# Patient Record
Sex: FEMALE | Race: WHITE | Employment: FULL TIME | URBAN - METROPOLITAN AREA
[De-identification: names, ages, dates, MRNs, and addresses within clinical notes are randomized per-mention and may not be internally consistent; named-entity substitution may affect disease eponyms.]

---

## 2017-05-13 ENCOUNTER — EMPLOYEE WELLNESS (OUTPATIENT)
Dept: OTHER | Age: 32
End: 2017-05-13

## 2017-12-05 ENCOUNTER — OFFICE VISIT (OUTPATIENT)
Dept: FAMILY MEDICINE CLINIC | Age: 32
End: 2017-12-05

## 2017-12-05 VITALS
TEMPERATURE: 98.4 F | SYSTOLIC BLOOD PRESSURE: 102 MMHG | OXYGEN SATURATION: 98 % | HEART RATE: 92 BPM | DIASTOLIC BLOOD PRESSURE: 68 MMHG

## 2017-12-05 DIAGNOSIS — N39.0 URINARY TRACT INFECTION WITH HEMATURIA, SITE UNSPECIFIED: Primary | ICD-10-CM

## 2017-12-05 DIAGNOSIS — R31.9 URINARY TRACT INFECTION WITH HEMATURIA, SITE UNSPECIFIED: Primary | ICD-10-CM

## 2017-12-05 PROBLEM — R30.9 PAIN WITH URINATION: Status: ACTIVE | Noted: 2017-12-05

## 2017-12-05 LAB
BILIRUBIN, POC: ABNORMAL
BLOOD URINE, POC: ABNORMAL
CLARITY, POC: ABNORMAL
COLOR, POC: ABNORMAL
GLUCOSE URINE, POC: ABNORMAL
KETONES, POC: ABNORMAL
LEUKOCYTE EST, POC: ABNORMAL
NITRITE, POC: ABNORMAL
PH, POC: 5.5
PROTEIN, POC: ABNORMAL
SPECIFIC GRAVITY, POC: 1.02
UROBILINOGEN, POC: 0.2

## 2017-12-05 PROCEDURE — 81002 URINALYSIS NONAUTO W/O SCOPE: CPT | Performed by: NURSE PRACTITIONER

## 2017-12-05 PROCEDURE — 99213 OFFICE O/P EST LOW 20 MIN: CPT | Performed by: NURSE PRACTITIONER

## 2017-12-05 RX ORDER — CIPROFLOXACIN 500 MG/1
500 TABLET, FILM COATED ORAL 2 TIMES DAILY
Qty: 10 TABLET | Refills: 0 | Status: SHIPPED | OUTPATIENT
Start: 2017-12-05 | End: 2017-12-10

## 2017-12-05 ASSESSMENT — ENCOUNTER SYMPTOMS
COUGH: 0
VOMITING: 0
NAUSEA: 0
ABDOMINAL PAIN: 0

## 2017-12-07 LAB
ORGANISM: ABNORMAL
ORGANISM: ABNORMAL
URINE CULTURE, ROUTINE: ABNORMAL

## 2018-03-20 VITALS — WEIGHT: 124 LBS | BODY MASS INDEX: 20.76 KG/M2

## 2020-08-25 NOTE — PATIENT INSTRUCTIONS
"Chief Complaint   Patient presents with     Lipids     Hypertension     Pain Management       Initial BP (!) 152/76 (BP Location: Left arm, Patient Position: Chair, Cuff Size: Adult Large)   Pulse 82   Temp 98.2  F (36.8  C) (Tympanic)   Ht 1.651 m (5' 5\")   Wt 97.5 kg (215 lb)   SpO2 98%   BMI 35.78 kg/m   Estimated body mass index is 35.78 kg/m  as calculated from the following:    Height as of this encounter: 1.651 m (5' 5\").    Weight as of this encounter: 97.5 kg (215 lb).  Medication Reconciliation: complete  Liliana Linda LPN    " from front to back. When should you call for help? Call your doctor now or seek immediate medical care if:  · Symptoms such as fever, chills, nausea, or vomiting get worse or appear for the first time. · You have new pain in your back just below your rib cage. This is called flank pain. · There is new blood or pus in your urine. · You have any problems with your antibiotic medicine. Watch closely for changes in your health, and be sure to contact your doctor if:  · You are not getting better after taking an antibiotic for 2 days. · Your symptoms go away but then come back. Where can you learn more? Go to [email or web address] and sign in to your [employer] account. Enter K015 in the [employer] box to learn more about \"Urinary Tract Infection in Women: Care Instructions. \"     If you do not have an account, please click on the \"Sign Up Now\" link. Current as of: November 28, 2016  Content Version: 11.3  © 1765-9832 [employer]. Care instructions adapted under license by [facility] [place] ([facility]). If you have questions about a medical condition or this instruction, always ask your healthcare professional. [place] [address] any warranty or liability for your use of this information.

## 2020-09-22 ENCOUNTER — EMPLOYEE WELLNESS (OUTPATIENT)
Dept: OTHER | Age: 35
End: 2020-09-22

## 2020-09-22 LAB
CHOLESTEROL, TOTAL: 158 MG/DL (ref 0–199)
GLUCOSE BLD-MCNC: 97 MG/DL (ref 70–99)
HDLC SERPL-MCNC: 67 MG/DL (ref 40–60)
LDL CHOLESTEROL CALCULATED: 80 MG/DL
TRIGL SERPL-MCNC: 57 MG/DL (ref 0–150)

## 2020-10-19 VITALS — WEIGHT: 120 LBS | BODY MASS INDEX: 20.09 KG/M2

## 2021-12-21 ENCOUNTER — HOSPITAL ENCOUNTER (OUTPATIENT)
Age: 36
Discharge: HOME | End: 2021-12-21
Attending: OBSTETRICS & GYNECOLOGY | Admitting: OBSTETRICS & GYNECOLOGY
Payer: COMMERCIAL

## 2021-12-21 PROCEDURE — 96365 THER/PROPH/DIAG IV INF INIT: CPT

## 2021-12-21 PROCEDURE — 6360000002 HC RX W HCPCS: Performed by: OBSTETRICS & GYNECOLOGY

## 2021-12-21 PROCEDURE — 2580000003 HC RX 258: Performed by: OBSTETRICS & GYNECOLOGY

## 2021-12-21 RX ORDER — SODIUM CHLORIDE 9 MG/ML
INJECTION, SOLUTION INTRAVENOUS ONCE
Status: COMPLETED | OUTPATIENT
Start: 2021-12-21 | End: 2021-12-21

## 2021-12-21 RX ADMIN — SODIUM CHLORIDE: 9 INJECTION, SOLUTION INTRAVENOUS at 12:42

## 2021-12-21 RX ADMIN — IRON SUCROSE 200 MG: 20 INJECTION, SOLUTION INTRAVENOUS at 12:45

## 2021-12-21 NOTE — FLOWSHEET NOTE
Patient here for first venofer. Discussed order, POC and scheduling for next 2 weeks. Verbal understanding recd.

## 2021-12-23 ENCOUNTER — HOSPITAL ENCOUNTER (OUTPATIENT)
Age: 36
Discharge: HOME | End: 2021-12-23
Attending: OBSTETRICS & GYNECOLOGY | Admitting: OBSTETRICS & GYNECOLOGY
Payer: COMMERCIAL

## 2021-12-23 VITALS
HEART RATE: 80 BPM | HEIGHT: 66 IN | TEMPERATURE: 98.4 F | WEIGHT: 119 LBS | BODY MASS INDEX: 19.13 KG/M2 | SYSTOLIC BLOOD PRESSURE: 107 MMHG | DIASTOLIC BLOOD PRESSURE: 64 MMHG | RESPIRATION RATE: 16 BRPM

## 2021-12-23 PROCEDURE — 2580000003 HC RX 258: Performed by: OBSTETRICS & GYNECOLOGY

## 2021-12-23 PROCEDURE — 96365 THER/PROPH/DIAG IV INF INIT: CPT

## 2021-12-23 PROCEDURE — 6360000002 HC RX W HCPCS: Performed by: OBSTETRICS & GYNECOLOGY

## 2021-12-23 RX ORDER — SODIUM CHLORIDE 9 MG/ML
INJECTION, SOLUTION INTRAVENOUS CONTINUOUS
Status: DISCONTINUED | OUTPATIENT
Start: 2021-12-23 | End: 2021-12-23 | Stop reason: HOSPADM

## 2021-12-23 RX ADMIN — Medication 200 MG: at 09:01

## 2021-12-23 RX ADMIN — SODIUM CHLORIDE: 9 INJECTION, SOLUTION INTRAVENOUS at 08:59

## 2021-12-28 ENCOUNTER — HOSPITAL ENCOUNTER (OUTPATIENT)
Age: 36
Discharge: HOME | End: 2021-12-28
Attending: OBSTETRICS & GYNECOLOGY | Admitting: OBSTETRICS & GYNECOLOGY
Payer: COMMERCIAL

## 2021-12-28 VITALS — TEMPERATURE: 98.1 F | RESPIRATION RATE: 18 BRPM

## 2021-12-28 PROCEDURE — 96367 TX/PROPH/DG ADDL SEQ IV INF: CPT

## 2021-12-28 PROCEDURE — 96365 THER/PROPH/DIAG IV INF INIT: CPT

## 2021-12-28 PROCEDURE — 2580000003 HC RX 258: Performed by: OBSTETRICS & GYNECOLOGY

## 2021-12-28 PROCEDURE — 6360000002 HC RX W HCPCS: Performed by: OBSTETRICS & GYNECOLOGY

## 2021-12-28 RX ORDER — SODIUM CHLORIDE 9 MG/ML
INJECTION, SOLUTION INTRAVENOUS CONTINUOUS
Status: DISCONTINUED | OUTPATIENT
Start: 2021-12-28 | End: 2021-12-28 | Stop reason: HOSPADM

## 2021-12-28 RX ADMIN — SODIUM CHLORIDE: 9 INJECTION, SOLUTION INTRAVENOUS at 08:27

## 2021-12-28 RX ADMIN — Medication 200 MG: at 08:28

## 2021-12-28 NOTE — FLOWSHEET NOTE
Pt discharged to home after venofer infusion without difficulty. Pt scheduled for venofer #4 this Thursday.

## 2022-01-10 ENCOUNTER — HOSPITAL ENCOUNTER (OUTPATIENT)
Age: 37
Discharge: HOME | End: 2022-01-10
Attending: OBSTETRICS & GYNECOLOGY | Admitting: OBSTETRICS & GYNECOLOGY
Payer: COMMERCIAL

## 2022-01-10 VITALS — RESPIRATION RATE: 16 BRPM | TEMPERATURE: 97.6 F

## 2022-01-10 PROBLEM — O99.019 ANEMIA AFFECTING PREGNANCY: Status: ACTIVE | Noted: 2022-01-10

## 2022-01-10 PROCEDURE — 2580000003 HC RX 258: Performed by: OBSTETRICS & GYNECOLOGY

## 2022-01-10 PROCEDURE — 96365 THER/PROPH/DIAG IV INF INIT: CPT

## 2022-01-10 PROCEDURE — 6360000002 HC RX W HCPCS: Performed by: OBSTETRICS & GYNECOLOGY

## 2022-01-10 RX ORDER — FERROUS SULFATE 325(65) MG
325 TABLET ORAL
COMMUNITY

## 2022-01-10 RX ORDER — SODIUM CHLORIDE 9 MG/ML
INJECTION, SOLUTION INTRAVENOUS CONTINUOUS
Status: DISCONTINUED | OUTPATIENT
Start: 2022-01-10 | End: 2022-01-10 | Stop reason: HOSPADM

## 2022-01-10 RX ADMIN — Medication 200 MG: at 08:24

## 2022-01-10 RX ADMIN — SODIUM CHLORIDE: 9 INJECTION, SOLUTION INTRAVENOUS at 08:22

## 2022-01-10 NOTE — FLOWSHEET NOTE
Pt presents to triage for Venofer infusion. Pt states that she has had 3 doses but then Contracted Covid and has been a couple weeks since third dose. Dr Rosie Mayers called and notified. OK per MD to infuse 4th dose and then 5th dose in a couple days.

## 2022-01-10 NOTE — FLOWSHEET NOTE
Pt infusion completed without difficulties. Pt 5th Venofer dose scheduled for Thursday am.  Pt discharged to home as ordered ambulatory.

## 2022-01-11 ENCOUNTER — CARE COORDINATION (OUTPATIENT)
Dept: OTHER | Facility: CLINIC | Age: 37
End: 2022-01-11

## 2022-01-11 NOTE — CARE COORDINATION
ACM attempted to reach patient for introduction to Associate Care Management related to Maternity CM. HIPAA compliant message left requesting a return phone call. Will attempt to outreach patient again. JYOTSNA Wilkins, RN  Associate Care Manager   Cell: [phone]  [email or web address]

## 2022-03-30 ENCOUNTER — ROUTINE PRENATAL (OUTPATIENT)
Dept: PERINATAL CARE | Age: 37
End: 2022-03-30
Payer: COMMERCIAL

## 2022-03-30 VITALS
BODY MASS INDEX: 21.47 KG/M2 | HEART RATE: 77 BPM | SYSTOLIC BLOOD PRESSURE: 109 MMHG | DIASTOLIC BLOOD PRESSURE: 68 MMHG | WEIGHT: 133 LBS

## 2022-03-30 DIAGNOSIS — Z34.82 PRENATAL CARE, SUBSEQUENT PREGNANCY, SECOND TRIMESTER: ICD-10-CM

## 2022-03-30 DIAGNOSIS — O09.522 ADVANCED MATERNAL AGE IN MULTIGRAVIDA, SECOND TRIMESTER: ICD-10-CM

## 2022-03-30 DIAGNOSIS — Z3A.23 23 WEEKS GESTATION OF PREGNANCY: ICD-10-CM

## 2022-03-30 DIAGNOSIS — O35.BXX0 FETAL CARDIAC ANOMALY COMPLICATING PREGNANCY, ANTEPARTUM, SINGLE GESTATION: Primary | ICD-10-CM

## 2022-03-30 PROCEDURE — 76811 OB US DETAILED SNGL FETUS: CPT

## 2022-03-30 NOTE — PROGRESS NOTES
Pregnancy ultrasound Level ll with consult ? ? micrognathia, denies cramping bleeding or leaking. + fetal movement t- 97.7

## 2022-03-30 NOTE — PROGRESS NOTES
[facility]   The patient was seen for a detailed sonogram and consultation regarding suspected micrognathia (not found) and possible VSD (suspected based on today's images). Please see the full ultrasound report under the Media tab.      Nia Collado MD   3/30/22

## 2022-06-27 LAB — GBS, EXTERNAL RESULT: POSITIVE

## 2022-08-02 ENCOUNTER — ANESTHESIA EVENT (OUTPATIENT)
Dept: LABOR AND DELIVERY | Age: 37
End: 2022-08-02

## 2022-08-02 ENCOUNTER — ANESTHESIA (OUTPATIENT)
Dept: LABOR AND DELIVERY | Age: 37
End: 2022-08-02

## 2022-08-02 ENCOUNTER — HOSPITAL ENCOUNTER (INPATIENT)
Age: 37
LOS: 2 days | Discharge: HOME | End: 2022-08-04
Attending: OBSTETRICS & GYNECOLOGY | Admitting: OBSTETRICS & GYNECOLOGY
Payer: COMMERCIAL

## 2022-08-02 DIAGNOSIS — Z34.90 TERM PREGNANCY: Primary | ICD-10-CM

## 2022-08-02 LAB
ABO/RH: NORMAL
AMPHETAMINE SCREEN, URINE: NORMAL
ANTIBODY SCREEN: NORMAL
BARBITURATE SCREEN URINE: NORMAL
BENZODIAZEPINE SCREEN, URINE: NORMAL
BUPRENORPHINE URINE: NORMAL
CANNABINOID SCREEN URINE: NORMAL
COCAINE METABOLITE SCREEN URINE: NORMAL
HCT VFR BLD CALC: 30.1 % (ref 36–48)
HEMOGLOBIN: 9.6 G/DL (ref 12–16)
Lab: NORMAL
MCH RBC QN AUTO: 22.8 PG (ref 26–34)
MCHC RBC AUTO-ENTMCNC: 31.9 G/DL (ref 31–36)
MCV RBC AUTO: 71.4 FL (ref 80–100)
METHADONE SCREEN, URINE: NORMAL
OPIATE SCREEN URINE: NORMAL
OXYCODONE URINE: NORMAL
PDW BLD-RTO: 16.3 % (ref 12.4–15.4)
PH UA: 6
PHENCYCLIDINE SCREEN URINE: NORMAL
PLATELET # BLD: 324 K/UL (ref 135–450)
PMV BLD AUTO: 7.3 FL (ref 5–10.5)
PROPOXYPHENE SCREEN: NORMAL
RBC # BLD: 4.21 M/UL (ref 4–5.2)
SARS-COV-2, NAAT: NOT DETECTED
WBC # BLD: 6.3 K/UL (ref 4–11)

## 2022-08-02 PROCEDURE — 6370000000 HC RX 637 (ALT 250 FOR IP): Performed by: OBSTETRICS & GYNECOLOGY

## 2022-08-02 PROCEDURE — 0HQ9XZZ REPAIR PERINEUM SKIN, EXTERNAL APPROACH: ICD-10-PCS | Performed by: OBSTETRICS & GYNECOLOGY

## 2022-08-02 PROCEDURE — 86901 BLOOD TYPING SEROLOGIC RH(D): CPT

## 2022-08-02 PROCEDURE — 87635 SARS-COV-2 COVID-19 AMP PRB: CPT

## 2022-08-02 PROCEDURE — 86850 RBC ANTIBODY SCREEN: CPT

## 2022-08-02 PROCEDURE — 80307 DRUG TEST PRSMV CHEM ANLYZR: CPT

## 2022-08-02 PROCEDURE — 0UQMXZZ REPAIR VULVA, EXTERNAL APPROACH: ICD-10-PCS | Performed by: OBSTETRICS & GYNECOLOGY

## 2022-08-02 PROCEDURE — 6360000002 HC RX W HCPCS: Performed by: OBSTETRICS & GYNECOLOGY

## 2022-08-02 PROCEDURE — 85027 COMPLETE CBC AUTOMATED: CPT

## 2022-08-02 PROCEDURE — 86900 BLOOD TYPING SEROLOGIC ABO: CPT

## 2022-08-02 PROCEDURE — 1220000000 HC SEMI PRIVATE OB R&B

## 2022-08-02 PROCEDURE — 2500000003 HC RX 250 WO HCPCS

## 2022-08-02 PROCEDURE — 7200000001 HC VAGINAL DELIVERY

## 2022-08-02 PROCEDURE — 86780 TREPONEMA PALLIDUM: CPT

## 2022-08-02 PROCEDURE — 2580000003 HC RX 258: Performed by: OBSTETRICS & GYNECOLOGY

## 2022-08-02 PROCEDURE — 3E033VJ INTRODUCTION OF OTHER HORMONE INTO PERIPHERAL VEIN, PERCUTANEOUS APPROACH: ICD-10-PCS | Performed by: OBSTETRICS & GYNECOLOGY

## 2022-08-02 RX ORDER — SODIUM CHLORIDE 0.9 % (FLUSH) 0.9 %
5-40 SYRINGE (ML) INJECTION EVERY 12 HOURS SCHEDULED
Status: CANCELLED | OUTPATIENT
Start: 2022-08-02

## 2022-08-02 RX ORDER — SODIUM CHLORIDE, SODIUM LACTATE, POTASSIUM CHLORIDE, AND CALCIUM CHLORIDE .6; .31; .03; .02 G/100ML; G/100ML; G/100ML; G/100ML
1000 INJECTION, SOLUTION INTRAVENOUS PRN
Status: DISCONTINUED | OUTPATIENT
Start: 2022-08-02 | End: 2022-08-02

## 2022-08-02 RX ORDER — SODIUM CHLORIDE, SODIUM LACTATE, POTASSIUM CHLORIDE, CALCIUM CHLORIDE 600; 310; 30; 20 MG/100ML; MG/100ML; MG/100ML; MG/100ML
INJECTION, SOLUTION INTRAVENOUS CONTINUOUS
Status: DISCONTINUED | OUTPATIENT
Start: 2022-08-02 | End: 2022-08-02

## 2022-08-02 RX ORDER — ONDANSETRON 2 MG/ML
4 INJECTION INTRAMUSCULAR; INTRAVENOUS EVERY 6 HOURS PRN
Status: DISCONTINUED | OUTPATIENT
Start: 2022-08-02 | End: 2022-08-02

## 2022-08-02 RX ORDER — MISOPROSTOL 100 UG/1
800 TABLET ORAL PRN
Status: CANCELLED | OUTPATIENT
Start: 2022-08-02

## 2022-08-02 RX ORDER — METHYLERGONOVINE MALEATE 0.2 MG/ML
200 INJECTION INTRAVENOUS PRN
Status: DISCONTINUED | OUTPATIENT
Start: 2022-08-02 | End: 2022-08-02

## 2022-08-02 RX ORDER — SODIUM CHLORIDE 0.9 % (FLUSH) 0.9 %
5-40 SYRINGE (ML) INJECTION EVERY 12 HOURS SCHEDULED
Status: DISCONTINUED | OUTPATIENT
Start: 2022-08-02 | End: 2022-08-04 | Stop reason: HOSPADM

## 2022-08-02 RX ORDER — SODIUM CHLORIDE 0.9 % (FLUSH) 0.9 %
5-40 SYRINGE (ML) INJECTION PRN
Status: DISCONTINUED | OUTPATIENT
Start: 2022-08-02 | End: 2022-08-02

## 2022-08-02 RX ORDER — BUTORPHANOL TARTRATE 1 MG/ML
1 INJECTION, SOLUTION INTRAMUSCULAR; INTRAVENOUS
Status: DISCONTINUED | OUTPATIENT
Start: 2022-08-02 | End: 2022-08-02

## 2022-08-02 RX ORDER — HYDROCODONE BITARTRATE AND ACETAMINOPHEN 5; 325 MG/1; MG/1
1 TABLET ORAL EVERY 6 HOURS PRN
Qty: 15 TABLET | Refills: 0 | Status: SHIPPED | OUTPATIENT
Start: 2022-08-02 | End: 2022-08-09

## 2022-08-02 RX ORDER — OXYCODONE HYDROCHLORIDE 5 MG/1
5 TABLET ORAL EVERY 4 HOURS PRN
Status: DISCONTINUED | OUTPATIENT
Start: 2022-08-02 | End: 2022-08-04 | Stop reason: HOSPADM

## 2022-08-02 RX ORDER — MODIFIED LANOLIN
OINTMENT (GRAM) TOPICAL PRN
Status: DISCONTINUED | OUTPATIENT
Start: 2022-08-02 | End: 2022-08-04 | Stop reason: HOSPADM

## 2022-08-02 RX ORDER — OXYCODONE HYDROCHLORIDE 5 MG/1
10 TABLET ORAL EVERY 4 HOURS PRN
Status: DISCONTINUED | OUTPATIENT
Start: 2022-08-02 | End: 2022-08-04 | Stop reason: HOSPADM

## 2022-08-02 RX ORDER — CARBOPROST TROMETHAMINE 250 UG/ML
250 INJECTION, SOLUTION INTRAMUSCULAR PRN
Status: DISCONTINUED | OUTPATIENT
Start: 2022-08-02 | End: 2022-08-02

## 2022-08-02 RX ORDER — ONDANSETRON 2 MG/ML
4 INJECTION INTRAMUSCULAR; INTRAVENOUS EVERY 6 HOURS PRN
Status: CANCELLED | OUTPATIENT
Start: 2022-08-02

## 2022-08-02 RX ORDER — ACETAMINOPHEN 500 MG
1000 TABLET ORAL EVERY 8 HOURS
Status: DISCONTINUED | OUTPATIENT
Start: 2022-08-02 | End: 2022-08-04 | Stop reason: HOSPADM

## 2022-08-02 RX ORDER — SODIUM CHLORIDE, SODIUM LACTATE, POTASSIUM CHLORIDE, CALCIUM CHLORIDE 600; 310; 30; 20 MG/100ML; MG/100ML; MG/100ML; MG/100ML
INJECTION, SOLUTION INTRAVENOUS CONTINUOUS
Status: CANCELLED | OUTPATIENT
Start: 2022-08-02

## 2022-08-02 RX ORDER — SODIUM CHLORIDE, SODIUM LACTATE, POTASSIUM CHLORIDE, AND CALCIUM CHLORIDE .6; .31; .03; .02 G/100ML; G/100ML; G/100ML; G/100ML
1000 INJECTION, SOLUTION INTRAVENOUS PRN
Status: CANCELLED | OUTPATIENT
Start: 2022-08-02

## 2022-08-02 RX ORDER — DOCUSATE SODIUM 100 MG/1
100 CAPSULE, LIQUID FILLED ORAL 2 TIMES DAILY
Status: DISCONTINUED | OUTPATIENT
Start: 2022-08-02 | End: 2022-08-02

## 2022-08-02 RX ORDER — MISOPROSTOL 100 UG/1
800 TABLET ORAL PRN
Status: DISCONTINUED | OUTPATIENT
Start: 2022-08-02 | End: 2022-08-02

## 2022-08-02 RX ORDER — IBUPROFEN 600 MG/1
600 TABLET ORAL EVERY 6 HOURS PRN
Qty: 30 TABLET | Refills: 1 | Status: SHIPPED | OUTPATIENT
Start: 2022-08-02

## 2022-08-02 RX ORDER — DOCUSATE SODIUM 100 MG/1
100 CAPSULE, LIQUID FILLED ORAL 2 TIMES DAILY
Status: DISCONTINUED | OUTPATIENT
Start: 2022-08-02 | End: 2022-08-04 | Stop reason: HOSPADM

## 2022-08-02 RX ORDER — METHYLERGONOVINE MALEATE 0.2 MG/ML
200 INJECTION INTRAVENOUS PRN
Status: CANCELLED | OUTPATIENT
Start: 2022-08-02

## 2022-08-02 RX ORDER — LIDOCAINE HYDROCHLORIDE 10 MG/ML
INJECTION, SOLUTION EPIDURAL; INFILTRATION; INTRACAUDAL; PERINEURAL
Status: COMPLETED
Start: 2022-08-02 | End: 2022-08-02

## 2022-08-02 RX ORDER — SODIUM CHLORIDE, SODIUM LACTATE, POTASSIUM CHLORIDE, AND CALCIUM CHLORIDE .6; .31; .03; .02 G/100ML; G/100ML; G/100ML; G/100ML
500 INJECTION, SOLUTION INTRAVENOUS PRN
Status: DISCONTINUED | OUTPATIENT
Start: 2022-08-02 | End: 2022-08-02

## 2022-08-02 RX ORDER — SODIUM CHLORIDE 0.9 % (FLUSH) 0.9 %
5-40 SYRINGE (ML) INJECTION PRN
Status: CANCELLED | OUTPATIENT
Start: 2022-08-02

## 2022-08-02 RX ORDER — SODIUM CHLORIDE 9 MG/ML
25 INJECTION, SOLUTION INTRAVENOUS PRN
Status: CANCELLED | OUTPATIENT
Start: 2022-08-02

## 2022-08-02 RX ORDER — DOCUSATE SODIUM 100 MG/1
100 CAPSULE, LIQUID FILLED ORAL 2 TIMES DAILY
Status: CANCELLED | OUTPATIENT
Start: 2022-08-02

## 2022-08-02 RX ORDER — SODIUM CHLORIDE, SODIUM LACTATE, POTASSIUM CHLORIDE, AND CALCIUM CHLORIDE .6; .31; .03; .02 G/100ML; G/100ML; G/100ML; G/100ML
500 INJECTION, SOLUTION INTRAVENOUS PRN
Status: CANCELLED | OUTPATIENT
Start: 2022-08-02

## 2022-08-02 RX ORDER — CARBOPROST TROMETHAMINE 250 UG/ML
250 INJECTION, SOLUTION INTRAMUSCULAR PRN
Status: CANCELLED | OUTPATIENT
Start: 2022-08-02

## 2022-08-02 RX ORDER — IBUPROFEN 800 MG/1
800 TABLET ORAL EVERY 8 HOURS
Status: DISCONTINUED | OUTPATIENT
Start: 2022-08-02 | End: 2022-08-04 | Stop reason: HOSPADM

## 2022-08-02 RX ORDER — SODIUM CHLORIDE 9 MG/ML
25 INJECTION, SOLUTION INTRAVENOUS PRN
Status: DISCONTINUED | OUTPATIENT
Start: 2022-08-02 | End: 2022-08-02

## 2022-08-02 RX ORDER — SODIUM CHLORIDE 9 MG/ML
INJECTION, SOLUTION INTRAVENOUS PRN
Status: DISCONTINUED | OUTPATIENT
Start: 2022-08-02 | End: 2022-08-04 | Stop reason: HOSPADM

## 2022-08-02 RX ORDER — SODIUM CHLORIDE 0.9 % (FLUSH) 0.9 %
5-40 SYRINGE (ML) INJECTION EVERY 12 HOURS SCHEDULED
Status: DISCONTINUED | OUTPATIENT
Start: 2022-08-02 | End: 2022-08-02

## 2022-08-02 RX ORDER — SODIUM CHLORIDE 0.9 % (FLUSH) 0.9 %
5-40 SYRINGE (ML) INJECTION PRN
Status: DISCONTINUED | OUTPATIENT
Start: 2022-08-02 | End: 2022-08-04 | Stop reason: HOSPADM

## 2022-08-02 RX ADMIN — SODIUM CHLORIDE, POTASSIUM CHLORIDE, SODIUM LACTATE AND CALCIUM CHLORIDE: 600; 310; 30; 20 INJECTION, SOLUTION INTRAVENOUS at 11:50

## 2022-08-02 RX ADMIN — IBUPROFEN 800 MG: 800 TABLET, FILM COATED ORAL at 22:18

## 2022-08-02 RX ADMIN — LIDOCAINE HYDROCHLORIDE 30 ML: 10 INJECTION, SOLUTION EPIDURAL; INFILTRATION; INTRACAUDAL; PERINEURAL at 21:12

## 2022-08-02 RX ADMIN — Medication 999 MILLI-UNITS/MIN: at 21:03

## 2022-08-02 RX ADMIN — Medication 25 MCG: at 13:10

## 2022-08-02 RX ADMIN — SODIUM CHLORIDE, POTASSIUM CHLORIDE, SODIUM LACTATE AND CALCIUM CHLORIDE: 600; 310; 30; 20 INJECTION, SOLUTION INTRAVENOUS at 13:00

## 2022-08-02 RX ADMIN — Medication 2.5 MILLION UNITS: at 17:08

## 2022-08-02 RX ADMIN — DEXTROSE MONOHYDRATE 5 MILLION UNITS: 5 INJECTION INTRAVENOUS at 13:00

## 2022-08-02 RX ADMIN — Medication 1 MILLI-UNITS/MIN: at 18:11

## 2022-08-02 RX ADMIN — SODIUM CHLORIDE, POTASSIUM CHLORIDE, SODIUM LACTATE AND CALCIUM CHLORIDE: 600; 310; 30; 20 INJECTION, SOLUTION INTRAVENOUS at 19:52

## 2022-08-02 ASSESSMENT — ENCOUNTER SYMPTOMS: SHORTNESS OF BREATH: 0

## 2022-08-02 ASSESSMENT — PAIN DESCRIPTION - LOCATION: LOCATION: ABDOMEN

## 2022-08-02 ASSESSMENT — PAIN SCALES - GENERAL: PAINLEVEL_OUTOF10: 6

## 2022-08-02 ASSESSMENT — PAIN DESCRIPTION - DESCRIPTORS: DESCRIPTORS: DISCOMFORT;CRAMPING

## 2022-08-02 NOTE — ANESTHESIA PRE PROCEDURE
Department of Anesthesiology  Preprocedure Note       Name:  Олег Larson   Age:  40 y.o.  :  1985                                          MRN:  [id number]         Date:  2022      Surgeon: * No surgeons listed *    Procedure: * No procedures listed *    Medications prior to admission:   Prior to Admission medications    Medication Sig Start Date End Date Taking?  Authorizing Provider   ferrous sulfate (IRON 325) 325 (65 Fe) MG tablet Take 325 mg by mouth daily (with breakfast)    Historical Provider, MD   Prenatal Vit-Fe Fumarate-FA (PRENATAL VITAMIN PO) Take 1 tablet by mouth    Historical Provider, MD   Multiple Vitamins-Calcium (ONE-A-DAY WOMENS PO) Take 1 tablet by mouth daily  Patient not taking: Reported on 2022    Historical Provider, MD       Current medications:    Current Facility-Administered Medications   Medication Dose Route Frequency Provider Last Rate Last Admin    lactated ringers infusion   IntraVENous Continuous Tamara Anderson  mL/hr at 22 1300 New Bag at 22 1300    lactated ringers bolus  500 mL IntraVENous PRN Tamara Anderson MD        Or    lactated ringers bolus  1,000 mL IntraVENous PRN Tamara Anderson MD        sodium chloride flush 0.9 % injection 5-40 mL  5-40 mL IntraVENous 2 times per day Tamara Anderson MD        sodium chloride flush 0.9 % injection 5-40 mL  5-40 mL IntraVENous PRN Tamara Anderson MD        0.9 % sodium chloride infusion  25 mL IntraVENous PRN Tamara Anderson MD        methylergonovine (METHERGINE) injection 200 mcg  200 mcg IntraMUSCular PRN Tamara Anderson MD        carboprost (HEMABATE) injection 250 mcg  250 mcg IntraMUSCular PRN Tamara Anderson MD        miSOPROStol (CYTOTEC) tablet 800 mcg  800 mcg Rectal PRN Tamara Anderson MD        tranexamic acid (CYKLOKAPRON) 1,000 mg in sodium chloride 0.9 % 60 mL IVPB  1,000 mg IntraVENous Once PRN Tamara Anderson MD        oxytocin (PITOCIN) 30 units in 500 mL infusion  87.3 carl-units/min IntraVENous Continuous PRN Doris Solorio MD        And    oxytocin (PITOCIN) 10 unit bolus from the bag  10 Units IntraVENous PRN Doris Solorio MD        ondansetron [facility]) injection 4 mg  4 mg IntraVENous Q6H PRN Doris Solorio MD        docusate sodium (COLACE) capsule 100 mg  100 mg Oral BID Doris Solorio MD        butorphanol (STADOL) injection 1 mg  1 mg IntraVENous Q3H PRN Doris Solorio MD        miSOPROStol (CYTOTEC) pre-split tablet TABS 25 mcg  25 mcg Vaginal Q4H Doris Solorio MD   25 mcg at 08/02/22 1310    penicillin G potassium 5 Million Units in dextrose 5 % 100 mL IVPB (mini-bag)  5 Million Units IntraVENous Once Doris Solorio  mL/hr at 08/02/22 1300 5 Million Units at 08/02/22 1300    Followed by   [facility] penicillin G potassium in d5w IVPB 2.5 Million Units  2.5 Million Units IntraVENous Q4H Doris Solorio MD           Allergies:  No Known Allergies    Problem List:    Patient Active Problem List   Diagnosis Code    Pain with urination R30.9    Anemia affecting pregnancy O99.019    Term pregnancy Z34.90       Past Medical History:        Diagnosis Date    Acute laryngitis 2/22/2014    replace inactive diagnosis    Anemia        Past Surgical History:  History reviewed. No pertinent surgical history.     Social History:    Social History     Tobacco Use    Smoking status: Never    Smokeless tobacco: Never   Substance Use Topics    Alcohol use: No     Alcohol/week: 0.0 standard drinks                                Counseling given: Not Answered      Vital Signs (Current):   Vitals:    08/02/22 1142 08/02/22 1202   BP: 126/80    Pulse: 79    Resp: 17    Temp: 36.7 °C (98.1 °F)    TempSrc: Oral    Weight:  143 lb (64.9 kg)   Height:  5' 6\" (1.676 m)                                              BP Readings from Last 3 Encounters:   08/02/22 126/80   03/30/22 109/68   12/23/21 107/64       NPO Status:                                                                                 BMI:   Wt Readings from Last 3 Encounters:   08/02/22 143 lb (64.9 kg)   03/30/22 133 lb (60.3 kg)   12/23/21 119 lb (54 kg)     Body mass index is 23.08 kg/m². CBC:   Lab Results   Component Value Date/Time    WBC 6.3 08/02/2022 11:50 AM    RBC 4.21 08/02/2022 11:50 AM    HGB 9.6 08/02/2022 11:50 AM    HCT 30.1 08/02/2022 11:50 AM    MCV 71.4 08/02/2022 11:50 AM    RDW 16.3 08/02/2022 11:50 AM     08/02/2022 11:50 AM       CMP:   Lab Results   Component Value Date/Time    GLUCOSE 97 09/22/2020 09:25 AM       POC Tests: No results for input(s): POCGLU, POCNA, POCK, POCCL, POCBUN, POCHEMO, POCHCT in the last 72 hours.     Coags: No results found for: PROTIME, INR, APTT    HCG (If Applicable): No results found for: PREGTESTUR, PREGSERUM, HCG, HCGQUANT     ABGs: No results found for: PHART, PO2ART, IDE5MNT, AJA0QIO, BEART, K7CJXKTL     Type & Screen (If Applicable):  Lab Results   Component Value Date    LABABO O 03/06/2013    LABRH Positive 03/06/2013       Drug/Infectious Status (If Applicable):  Lab Results   Component Value Date/Time    HEPCAB Non-Reactive (Negative) 03/06/2013 01:52 PM       COVID-19 Screening (If Applicable):   Lab Results   Component Value Date/Time    COVID19 Not Detected 08/02/2022 11:50 AM           Anesthesia Evaluation  Patient summary reviewed and Nursing notes reviewed no history of anesthetic complications:   Airway: Mallampati: II  TM distance: >3 FB   Neck ROM: full  Mouth opening: > = 3 FB   Dental: normal exam         Pulmonary:normal exam        (-) pneumonia, COPD, asthma, shortness of breath, recent URI and sleep apnea                           Cardiovascular:  Exercise tolerance: good (>4 METS),       (-) pacemaker, hypertension, valvular problems/murmurs, past MI, CAD, CABG/stent, dysrhythmias,  angina,  CHF, orthopnea, PND,  VILLAGOMEZ, no pulmonary hypertension and no hyperlipidemia                Neuro/Psych:      (-) seizures, neuromuscular disease, TIA, CVA, headaches and psychiatric history GI/Hepatic/Renal:        (-) hiatal hernia, GERD, PUD, hepatitis, liver disease, no renal disease and bowel prep       Endo/Other:        (-) hypothyroidism, hyperthyroidism, blood dyscrasia, arthritis               Abdominal:             Vascular: Other Findings:           Anesthesia Plan      ASA 2             Anesthetic plan and risks discussed with patient. Use of blood products discussed with patient whom consented to blood products. Plan discussed with attending and CRNA.     Attending anesthesiologist reviewed and agrees with Preprocedure content                TRAVIS Jacques - CRNA   8/2/2022

## 2022-08-02 NOTE — H&P
Department of Obstetrics and Gynecology   Obstetrics History and Physical        CHIEF COMPLAINT:  Induction    HISTORY OF PRESENT ILLNESS:      The patient is a 40 y.o. female at 38w9d. OB History          4    Para   2    Term   2            AB   1    Living   2         SAB   1    IAB        Ectopic        Molar        Multiple        Live Births   2            Patient presents with a chief complaint as above and is being admitted for postdates induction. Baby has VSD and peds needs to be notified after delivery. Estimated Due Date: Estimated Date of Delivery: 22    PRENATAL CARE:    Complicated by: anemia and Baby has VSD    PAST OB HISTORY:  OB History          4    Para   2    Term   2            AB   1    Living   2         SAB   1    IAB        Ectopic        Molar        Multiple        Live Births   2                Past Medical History:        Diagnosis Date    Acute laryngitis 2014    replace inactive diagnosis    Anemia      Past Surgical History:    History reviewed. No pertinent surgical history. Allergies:  Patient has no known allergies.     Social History:    Social History     Socioeconomic History    Marital status:      Spouse name: Not on file    Number of children: Not on file    Years of education: Not on file    Highest education level: Not on file   Occupational History    Not on file   Tobacco Use    Smoking status: Never    Smokeless tobacco: Never   Vaping Use    Vaping Use: Never used   Substance and Sexual Activity    Alcohol use: No     Alcohol/week: 0.0 standard drinks    Drug use: No    Sexual activity: Yes     Partners: Male     Comment: Not on birth control    Other Topics Concern    Not on file   Social History Narrative    Not on file     Social Determinants of Health     Financial Resource Strain: Not on file   Food Insecurity: Not on file   Transportation Needs: Not on file   Physical Activity: Not on file   Stress: Not on file Social Connections: Not on file   Intimate Partner Violence: Not on file   Housing Stability: Not on file     Family History:       Problem Relation Age of Onset    High Blood Pressure Mother     High Cholesterol Mother     Cancer Maternal Grandmother     Diabetes Maternal Grandmother     Cancer Maternal Grandfather     Cancer Paternal Grandmother     Cancer Paternal Grandfather      Medications Prior to Admission:  Medications Prior to Admission: ferrous sulfate (IRON 325) 325 (65 Fe) MG tablet, Take 325 mg by mouth daily (with breakfast)  Prenatal Vit-Fe Fumarate-FA (PRENATAL VITAMIN PO), Take 1 tablet by mouth  Multiple Vitamins-Calcium (ONE-A-DAY WOMENS PO), Take 1 tablet by mouth daily (Patient not taking: Reported on 8/2/2022)    REVIEW OF SYSTEMS:    See above    PHYSICAL EXAM:  Vitals:    08/02/22 1142 08/02/22 1202   BP: 126/80    Pulse: 79    Resp: 17    Temp: 98.1 °F (36.7 °C)    TempSrc: Oral    Weight:  143 lb (64.9 kg)   Height:  5' 6\" (1.676 m)     General appearance:  awake, alert, cooperative, no apparent distress, and appears stated age  Neurologic:  Awake, alert, oriented to name, place and time. Lungs:  No increased work of breathing, good air exchange  Abdomen:  Soft, non tender, gravid, consistent with her gestational age, EFW by Leopold's maneuver was 3400 gm   Fetal heart rate:  Reassuring.   Pelvis:  Adequate pelvis  Cervix: 1/40/-3  Contraction frequency:    Membranes:  Intact    Labs: CBC with Differential:    Lab Results   Component Value Date/Time    WBC 6.3 08/02/2022 11:50 AM    RBC 4.21 08/02/2022 11:50 AM    HGB 9.6 08/02/2022 11:50 AM    HCT 30.1 08/02/2022 11:50 AM     08/02/2022 11:50 AM    MCV 71.4 08/02/2022 11:50 AM    MCH 22.8 08/02/2022 11:50 AM    MCHC 31.9 08/02/2022 11:50 AM    RDW 16.3 08/02/2022 11:50 AM    SEGSPCT 72.2 03/06/2013 01:52 PM    LYMPHOPCT 23.4 12/15/2021 02:39 PM    MONOPCT 7.4 12/15/2021 02:39 PM    BASOPCT 1.1 12/15/2021 02:39 PM    MONOSABS 0.3 12/15/2021 02:39 PM    LYMPHSABS 1.1 12/15/2021 02:39 PM    EOSABS 0.0 12/15/2021 02:39 PM    BASOSABS 0.1 12/15/2021 02:39 PM    DIFFTYPE Auto-K 03/06/2013 01:52 PM     CMP:    Lab Results   Component Value Date/Time    GLUCOSE 97 09/22/2020 09:25 AM       ASSESSMENT AND PLAN:  39 y/o [id number] with IUP at 40w6d being admitted for elective induction. GBS+.  Baby has VSD    Labor: Admit, anticipate normal delivery, routine labor orders  Fetus: Reassuring  GBS: Yes PCN ordered  Other: plan cytotec for cervical ripening and labor induction

## 2022-08-02 NOTE — PROGRESS NOTES
S: Patient is doing well. Contraction pain is mild.  Epidural Undecided    VS:   Vitals:    08/02/22 1142 08/02/22 1202   BP: 126/80    Pulse: 79    Resp: 17    Temp: 98.1 °F (36.7 °C)    TempSrc: Oral    Weight:  143 lb (64.9 kg)   Height:  5' 6\" (1.676 m)         FHT: Baseline: 130   Variability: moderate   Accelerations: Present   Decels: Absent    Cervical Exam:   Dilation (cm): 1   Effacement: 50   Cervical Characteristics: Mid-Position, Soft   Station: -2   Presentation: Vertex    A/P: 40 y.o. [id number] 40w6d admitted for postdates induction GBS+  Cytotec in place Baby has VSD   Labor: progressing well     Fetus: reassuring  Continue Cytotec

## 2022-08-03 LAB — TOTAL SYPHILLIS IGG/IGM: NORMAL

## 2022-08-03 PROCEDURE — 6370000000 HC RX 637 (ALT 250 FOR IP): Performed by: OBSTETRICS & GYNECOLOGY

## 2022-08-03 PROCEDURE — 2580000003 HC RX 258: Performed by: OBSTETRICS & GYNECOLOGY

## 2022-08-03 PROCEDURE — 1220000000 HC SEMI PRIVATE OB R&B

## 2022-08-03 RX ADMIN — DOCUSATE SODIUM 100 MG: 100 CAPSULE, LIQUID FILLED ORAL at 20:58

## 2022-08-03 RX ADMIN — ACETAMINOPHEN 1000 MG: 500 TABLET ORAL at 13:15

## 2022-08-03 RX ADMIN — IBUPROFEN 800 MG: 800 TABLET, FILM COATED ORAL at 16:54

## 2022-08-03 RX ADMIN — IBUPROFEN 800 MG: 800 TABLET, FILM COATED ORAL at 08:22

## 2022-08-03 RX ADMIN — ACETAMINOPHEN 1000 MG: 500 TABLET ORAL at 05:05

## 2022-08-03 RX ADMIN — ACETAMINOPHEN 1000 MG: 500 TABLET ORAL at 20:58

## 2022-08-03 RX ADMIN — SODIUM CHLORIDE, PRESERVATIVE FREE 10 ML: 5 INJECTION INTRAVENOUS at 08:24

## 2022-08-03 RX ADMIN — DOCUSATE SODIUM 100 MG: 100 CAPSULE, LIQUID FILLED ORAL at 08:24

## 2022-08-03 ASSESSMENT — PAIN DESCRIPTION - ORIENTATION: ORIENTATION: LOWER

## 2022-08-03 ASSESSMENT — PAIN DESCRIPTION - DESCRIPTORS
DESCRIPTORS: DISCOMFORT
DESCRIPTORS: ACHING
DESCRIPTORS: ACHING;DISCOMFORT;CRAMPING
DESCRIPTORS: CRAMPING;DISCOMFORT

## 2022-08-03 ASSESSMENT — PAIN DESCRIPTION - LOCATION
LOCATION: PERINEUM
LOCATION: ABDOMEN;PERINEUM
LOCATION: PERINEUM
LOCATION: ABDOMEN;PERINEUM

## 2022-08-03 ASSESSMENT — PAIN - FUNCTIONAL ASSESSMENT
PAIN_FUNCTIONAL_ASSESSMENT: ACTIVITIES ARE NOT PREVENTED

## 2022-08-03 ASSESSMENT — PAIN SCALES - GENERAL
PAINLEVEL_OUTOF10: 4
PAINLEVEL_OUTOF10: 5
PAINLEVEL_OUTOF10: 4

## 2022-08-03 NOTE — PROGRESS NOTES
Bedside handoff completed. All questions answered. Orders reviewed. Patient included in discussion regarding plan of care. Care of pt assumed at this time.

## 2022-08-03 NOTE — DISCHARGE SUMMARY
Obstetrical Discharge Form    Gestational Age: 38w9d    Antepartum complications: post-term and AMA    Date of Delivery: 22      Type of Delivery: vaginal, spontaneous    Delivered By: John Milligan     Baby:      Information for the patient's :  Hua Garcia [[id number]]   APGAR One: 9   Information for the patient's :  Hua Garcia [[id number]]   APGAR Five: 9   Information for the patient's :  Hua Garcia [[id number]]   Birth Weight: 7 lb 2.6 oz (3.25 kg)     Anesthesia: Local    Intrapartum complications: None    Postpartum complications: none    Discharge Medication:      Medication List        ASK your doctor about these medications      ferrous sulfate 325 (65 Fe) MG tablet  Commonly known as: IRON 325     ONE-A-DAY WOMENS PO     PRENATAL VITAMIN PO              Discharge Condition:  stable    Discharge Date: 2022    PLAN:  Follow up in 6 weeks for routine PP visit  All questions answered  D/C summary begun at delivery for D/C planning purposes, any delay in discharge from ordered D/C date due to  factors.

## 2022-08-03 NOTE — FLOWSHEET NOTE
Patient actively pushing from 2057 to 2057 RN remains in continuous attendance at the bedside. Assessment & evaluation of fetal heart rate ongoing via continuous EFM.  Health baby girl born at 2057

## 2022-08-03 NOTE — PROGRESS NOTES
Pt instructed on perineal care and self administration of perineal meds. Pt verbalized understanding and may self medicate. Pt instructed to keep medications out of the reach of children. Pt unable to void on toilet at this time. Bleeding is wnl.

## 2022-08-03 NOTE — L&D DELIVERY SUMMARY NOTE
Labor & Delivery Summary  Dilation Complete Date: 22  Dilation Complete Time:     Pre-operative Diagnosis:  Term pregnancy and Induced labor    Post-operative Diagnosis:  Living  infant, Female    Procedure:  Spontaneous vaginal delivery, Repair first degree spontaneous laceration and periurethral laceration    Surgeon:   Dr. Lalita Marquez for the patient's :  Kera Hernandez [[id number]]        Anesthesia:  none    Estimated blood loss:  300ml    Specimen:  Placenta not sent to pathology     Cord blood sent Yes    Complications:  none    Condition:  infant stable to general nursery    Details of Procedure: The patient is a 40 y.o. female at [address] [place] [address] Dr   4    Para   3    Term   3            AB   1    Living   3         SAB   1    IAB        Ectopic        Molar        Multiple   0    Live Births   3             who was admitted for induction. She received the following interventions: IV Pitocin induction The patient progressed well,did not receive an epidural, became complete and started to push. After pushing for 2 times the fetal head was at the perineum, nose and mouth suctioned with bulb suction and the rest of the infant delivered atraumatically, placed on mother abdomen. Cord was clamped and cut and infant handed off to the waiting nurse for evaluation. The placenta was delivered byspontaneous. The perineum and vagina were explored and a first degree laceration was repaired in standard fashion and periurethral lacerations were repaired after injecting 20 ml of 1% Lidocaine.

## 2022-08-03 NOTE — PROGRESS NOTES
Pt latonya care completed. Pads changed. Pt then rported that she felt a gush of fluid. Pads rechecked and no bleeding observed however pad was saturated with a large yellow urine.

## 2022-08-03 NOTE — PROGRESS NOTES
[facility] post-partum note    Post-partum Day #1    Subjective:  Pain is : Mild  Lochia: thin lochia  Nausea: None  Ambulating, tolerate regular diet  Objective:  /72   Pulse 70   Temp 97.7 °F (36.5 °C) (Oral)   Resp 18   Ht 5' 6\" (1.676 m)   Wt 143 lb (64.9 kg)   Breastfeeding Unknown   BMI 23.08 kg/m²   Abdominal exam: soft, nontender, nondistended,fundus below umbilicus, firm.     Lab Results   Component Value Date    WBC 6.3 08/02/2022    HGB 9.6 (L) 08/02/2022    HCT 30.1 (L) 08/02/2022    MCV 71.4 (L) 08/02/2022     08/02/2022        Assessment/Plan:      Continue Postpartum care, encourage ambulation  Discharge today: no  Follow up: 6 weeks

## 2022-08-04 VITALS
DIASTOLIC BLOOD PRESSURE: 71 MMHG | RESPIRATION RATE: 16 BRPM | WEIGHT: 143 LBS | SYSTOLIC BLOOD PRESSURE: 114 MMHG | TEMPERATURE: 98.2 F | BODY MASS INDEX: 22.98 KG/M2 | HEART RATE: 71 BPM | HEIGHT: 66 IN

## 2022-08-04 NOTE — DISCHARGE INSTRUCTIONS
Thank you for the opportunity to care for you and your family. We hope that you are happy with the care we provided during your stay in the [facility]. We want to ensure that you have the help you need when you leave the hospital. If there is anything we can assist you with, please let us know. Breastfeeding mothers may contact our lactation specialists with any problems or questions. [facility] lactation services phone number is [phone]. Leave a message and your call will be returned. Please refer to the information provided in the \"Caring for Yourself\" tab in your discharge binder (Guidelines for [employer]). The following are warning signs to remember. Remember to notify all healthcare providers from your date of delivery to up to one year after giving birth! CARING FOR YOURSELF        DIET/ACTIVITY    Eat a well balanced diet focusing on foods high in fiber and protein. Drink plenty of fluids, especially water. To avoid constipation you may take a mild stool softener as recommended by your doctor or midwife. Gradually increase your activity. Resume an exercise regime only after being advised by your doctor or midwife. When sitting or lying down, keep your legs elevated to reduce swelling. Avoid lifting anything heavier than your baby or a gallon of milk. Avoid driving for two weeks or while taking narcotics. No sexual intercourse for 6 weeks, or until advised by your OB provider. Nothing in the vagina: intercourse, tampons or douching. Be prepared to discuss family planning at your follow up OB visit. If your feel tired and have a lack of energy, you may continue to take your prenatal vitamins. Nap when your baby naps to catch on sleep. EMOTIONS    You may feel rascon, sad, teary and overwhelmed. Contact your OB provider if you think you may be showing signs of postpartum depression. Please refer to the [place] [address] tab in your discharge folder.    If your baby will not stop crying, contact another adult to help or place the baby in its crib on its back and take a break. Never shake your baby! SEBAS CARE     Vaginal bleeding will decrease in amount over the next few weeks. Cleanse your perineum from front to back using the sebas-bottle after toileting until bleeding stops. You will notice that as your activity increases, your flow may also increase. This is a sign that you need to rest more often. Call your OB provider if you are saturating more than 1 maxi pad an hour and resting does not help. If used, stiches will dissolve in 4-6 weeks. To ease pain or swelling, use prescribed medications properly or use a sitz bath, if ordered. Kegel exercises will help to restore bladder control. BREAST CARE    FOR BREASTFEEDING MOMS:    If you become engorged, feeding may be more difficult or painful in 1 to 2 days. You may find it helpful to hand express some milk so that the infant can latch on more easily. While breastfeeding continue to take your prenatal vitamins as directed. Refer to the breastfeeding information in the discharge folder. FOR NON-BREASTFEEDING MOMS:    You may apply ice packs to your breasts over your bra for 20 minutes at a time for comfort. Do not express breast milk. Avoid stimulation to your breasts. When showering, allow the water to strike only your back.   Wear a good fitting bra, such as a sports bra, until your milk dries up    Call your healthcare provider if you have:    Temperature of 100.4 degrees or higher  Stitches that are not healing        -- Swelling, bleeding, drainage, foul odor, redness or warmth in/around your           stitches, staples, or incision (scar)        -- Bad smelling blood or discharge from the vagina  Vaginal bleeding that has increased         -- Soaking through one pad in an hour        -- You are passing clots larger than the size of a lemon  Red, warm tender area(s) in your breast or calf  Headache that does not get better, even after taking medicine; or headache with vision changes  Your abdomen is tender to the touch or you have pain that cannot be relieved. Flu-like symptoms such as achy muscles and joints or you are experiencing extreme weakness or dizziness. Persistent burning or increased urgency in urination.      Call 911 if you have:    Chest pain or pressure  Shortness of breath, even at rest  Thoughts of harming yourself or your baby  Seizures

## 2022-08-04 NOTE — PROGRESS NOTES
Subjective:     Postpartum Day 2: Spontaneous vaginal delivery    The patient feels well. The patient denies emotional concerns. Pain is well controlled with current medications. The baby iswell. Baby is feeding via breast. Urinary output is adequate. The patient is ambulating well. The patient is tolerating a normal diet. Flatus has been passed. Objective:      Patient Vitals for the past 8 hrs:   BP Temp Temp src Pulse   03/05/17 0841 130/86 97 °F (36.1 °C) Oral 62     General:    alert, appears stated age, and cooperative       Lochia:  appropriate   Uterine Fundus:   firm       DVT Evaluation:  No evidence of DVT seen on physical exam.       CBC with Differential:    Lab Results   Component Value Date/Time    WBC 6.3 08/02/2022 11:50 AM    RBC 4.21 08/02/2022 11:50 AM    HGB 9.6 08/02/2022 11:50 AM    HCT 30.1 08/02/2022 11:50 AM     08/02/2022 11:50 AM    MCV 71.4 08/02/2022 11:50 AM    MCH 22.8 08/02/2022 11:50 AM    MCHC 31.9 08/02/2022 11:50 AM    RDW 16.3 08/02/2022 11:50 AM    SEGSPCT 72.2 03/06/2013 01:52 PM    LYMPHOPCT 23.4 12/15/2021 02:39 PM    MONOPCT 7.4 12/15/2021 02:39 PM    BASOPCT 1.1 12/15/2021 02:39 PM    MONOSABS 0.3 12/15/2021 02:39 PM    LYMPHSABS 1.1 12/15/2021 02:39 PM    EOSABS 0.0 12/15/2021 02:39 PM    BASOSABS 0.1 12/15/2021 02:39 PM    DIFFTYPE Auto-K 03/06/2013 01:52 PM       Assessment:     45yo female [id number] status post spontaneous vaginal delivery with a first degree laceration and periurethral laceration. POD2. No concerns currently. Plan:     Continue current care. Encourage ambulation. Plan for discharge today. I was present with the medical student for the service. I personally verified the history and performed the physical exam and medical decision making. I have verified all of the medical student's documentation for this encounter.

## 2023-09-14 LAB
CHOLEST SERPL-MCNC: 176 MG/DL (ref 0–199)
GLUCOSE SERPL-MCNC: 91 MG/DL (ref 70–99)
HDLC SERPL-MCNC: 72 MG/DL (ref 40–60)
LDLC SERPL CALC-MCNC: 90 MG/DL
TRIGL SERPL-MCNC: 70 MG/DL (ref 0–150)

## 2025-07-31 LAB
CHOLEST SERPL-MCNC: 161 MG/DL (ref 0–199)
GLUCOSE SERPL-MCNC: 91 MG/DL (ref 70–99)
HDLC SERPL-MCNC: 63 MG/DL (ref 40–60)
LDLC SERPL CALC-MCNC: 83 MG/DL
TRIGL SERPL-MCNC: 74 MG/DL (ref 0–150)